# Patient Record
Sex: FEMALE | Race: BLACK OR AFRICAN AMERICAN | NOT HISPANIC OR LATINO
[De-identification: names, ages, dates, MRNs, and addresses within clinical notes are randomized per-mention and may not be internally consistent; named-entity substitution may affect disease eponyms.]

---

## 2018-09-10 PROBLEM — Z00.00 ENCOUNTER FOR PREVENTIVE HEALTH EXAMINATION: Status: ACTIVE | Noted: 2018-09-10

## 2018-09-25 ENCOUNTER — APPOINTMENT (OUTPATIENT)
Dept: THORACIC SURGERY | Facility: CLINIC | Age: 53
End: 2018-09-25
Payer: MEDICAID

## 2018-09-25 VITALS
WEIGHT: 215 LBS | RESPIRATION RATE: 16 BRPM | HEIGHT: 67 IN | BODY MASS INDEX: 33.74 KG/M2 | OXYGEN SATURATION: 100 % | HEART RATE: 69 BPM | TEMPERATURE: 98.7 F | SYSTOLIC BLOOD PRESSURE: 123 MMHG | DIASTOLIC BLOOD PRESSURE: 84 MMHG

## 2018-09-25 DIAGNOSIS — Z87.19 PERSONAL HISTORY OF OTHER DISEASES OF THE DIGESTIVE SYSTEM: ICD-10-CM

## 2018-09-25 DIAGNOSIS — Z80.3 FAMILY HISTORY OF MALIGNANT NEOPLASM OF BREAST: ICD-10-CM

## 2018-09-25 DIAGNOSIS — K27.9 PEPTIC ULCER, SITE UNSPECIFIED, UNSPECIFIED AS ACUTE OR CHRONIC, W/OUT HEMORRHAGE OR PERFORATION: ICD-10-CM

## 2018-09-25 DIAGNOSIS — B96.81 PEPTIC ULCER, SITE UNSPECIFIED, UNSPECIFIED AS ACUTE OR CHRONIC, W/OUT HEMORRHAGE OR PERFORATION: ICD-10-CM

## 2018-09-25 DIAGNOSIS — Z86.2 PERSONAL HISTORY OF DISEASES OF THE BLOOD AND BLOOD-FORMING ORGANS AND CERTAIN DISORDERS INVOLVING THE IMMUNE MECHANISM: ICD-10-CM

## 2018-09-25 PROCEDURE — 99205 OFFICE O/P NEW HI 60 MIN: CPT

## 2018-09-25 RX ORDER — GLUC/MSM/COLGN2/HYAL/ANTIARTH3 375-375-20
TABLET ORAL DAILY
Refills: 0 | Status: ACTIVE | COMMUNITY

## 2018-09-28 PROBLEM — Z87.19 HISTORY OF HIATAL HERNIA: Status: RESOLVED | Noted: 2018-09-28 | Resolved: 2018-09-28

## 2018-09-28 PROBLEM — Z87.19 HISTORY OF DYSPHAGIA: Status: RESOLVED | Noted: 2018-09-28 | Resolved: 2018-09-28

## 2018-09-28 PROBLEM — K27.9 H PYLORI ULCER: Status: RESOLVED | Noted: 2018-09-28 | Resolved: 2018-09-28

## 2018-09-28 PROBLEM — Z86.2 HISTORY OF ANEMIA: Status: RESOLVED | Noted: 2018-09-28 | Resolved: 2018-09-28

## 2018-10-09 ENCOUNTER — FORM ENCOUNTER (OUTPATIENT)
Age: 53
End: 2018-10-09

## 2018-10-10 ENCOUNTER — APPOINTMENT (OUTPATIENT)
Dept: CT IMAGING | Facility: IMAGING CENTER | Age: 53
End: 2018-10-10
Payer: MEDICAID

## 2018-10-10 ENCOUNTER — OUTPATIENT (OUTPATIENT)
Dept: OUTPATIENT SERVICES | Facility: HOSPITAL | Age: 53
LOS: 1 days | End: 2018-10-10

## 2018-10-10 ENCOUNTER — OUTPATIENT (OUTPATIENT)
Dept: OUTPATIENT SERVICES | Facility: HOSPITAL | Age: 53
LOS: 1 days | End: 2018-10-10
Payer: MEDICAID

## 2018-10-10 VITALS
RESPIRATION RATE: 18 BRPM | HEIGHT: 67 IN | TEMPERATURE: 98 F | SYSTOLIC BLOOD PRESSURE: 120 MMHG | DIASTOLIC BLOOD PRESSURE: 80 MMHG | HEART RATE: 60 BPM | WEIGHT: 210.1 LBS

## 2018-10-10 DIAGNOSIS — L90.9 ATROPHIC DISORDER OF SKIN, UNSPECIFIED: Chronic | ICD-10-CM

## 2018-10-10 DIAGNOSIS — K44.9 DIAPHRAGMATIC HERNIA WITHOUT OBSTRUCTION OR GANGRENE: ICD-10-CM

## 2018-10-10 DIAGNOSIS — Z64.1 PROBLEMS RELATED TO MULTIPARITY: Chronic | ICD-10-CM

## 2018-10-10 LAB
BLD GP AB SCN SERPL QL: NEGATIVE — SIGNIFICANT CHANGE UP
BUN SERPL-MCNC: 14 MG/DL — SIGNIFICANT CHANGE UP (ref 7–23)
CALCIUM SERPL-MCNC: 8.7 MG/DL — SIGNIFICANT CHANGE UP (ref 8.4–10.5)
CHLORIDE SERPL-SCNC: 103 MMOL/L — SIGNIFICANT CHANGE UP (ref 98–107)
CO2 SERPL-SCNC: 25 MMOL/L — SIGNIFICANT CHANGE UP (ref 22–31)
CREAT SERPL-MCNC: 0.92 MG/DL — SIGNIFICANT CHANGE UP (ref 0.5–1.3)
GLUCOSE SERPL-MCNC: 142 MG/DL — HIGH (ref 70–99)
HCT VFR BLD CALC: 42.8 % — SIGNIFICANT CHANGE UP (ref 34.5–45)
HGB BLD-MCNC: 13.3 G/DL — SIGNIFICANT CHANGE UP (ref 11.5–15.5)
MCHC RBC-ENTMCNC: 26.2 PG — LOW (ref 27–34)
MCHC RBC-ENTMCNC: 31.1 % — LOW (ref 32–36)
MCV RBC AUTO: 84.3 FL — SIGNIFICANT CHANGE UP (ref 80–100)
NRBC # FLD: 0 — SIGNIFICANT CHANGE UP
PLATELET # BLD AUTO: 195 K/UL — SIGNIFICANT CHANGE UP (ref 150–400)
PMV BLD: 12.5 FL — SIGNIFICANT CHANGE UP (ref 7–13)
POTASSIUM SERPL-MCNC: 4.1 MMOL/L — SIGNIFICANT CHANGE UP (ref 3.5–5.3)
POTASSIUM SERPL-SCNC: 4.1 MMOL/L — SIGNIFICANT CHANGE UP (ref 3.5–5.3)
RBC # BLD: 5.08 M/UL — SIGNIFICANT CHANGE UP (ref 3.8–5.2)
RBC # FLD: 13.2 % — SIGNIFICANT CHANGE UP (ref 10.3–14.5)
RH IG SCN BLD-IMP: POSITIVE — SIGNIFICANT CHANGE UP
SODIUM SERPL-SCNC: 139 MMOL/L — SIGNIFICANT CHANGE UP (ref 135–145)
WBC # BLD: 4.89 K/UL — SIGNIFICANT CHANGE UP (ref 3.8–10.5)
WBC # FLD AUTO: 4.89 K/UL — SIGNIFICANT CHANGE UP (ref 3.8–10.5)

## 2018-10-10 PROCEDURE — 71250 CT THORAX DX C-: CPT

## 2018-10-10 PROCEDURE — 71250 CT THORAX DX C-: CPT | Mod: 26

## 2018-10-10 RX ORDER — SODIUM CHLORIDE 9 MG/ML
1000 INJECTION, SOLUTION INTRAVENOUS
Qty: 0 | Refills: 0 | Status: DISCONTINUED | OUTPATIENT
Start: 2018-10-24 | End: 2018-10-25

## 2018-10-10 NOTE — H&P PST ADULT - NSANTHOSAYNRD_GEN_A_CORE
No. GURWINDER screening performed.  STOP BANG Legend: 0-2 = LOW Risk; 3-4 = INTERMEDIATE Risk; 5-8 = HIGH Risk

## 2018-10-10 NOTE — H&P PST ADULT - NS PRO TALK SOMEONE YN
Patient to cath lab for pacemaker placement at this time in stable condition, accompanied by ALEXANDRA Underwood.   no

## 2018-10-10 NOTE — H&P PST ADULT - PROBLEM SELECTOR PLAN 1
This is a 52 y/o female who is scheduled for upper endoscopy, lap hiatal hernia repair, robotic assisted Nissen fundoplication on 10-24-18  * Given scrub cleanser and preop famotidine

## 2018-10-24 ENCOUNTER — APPOINTMENT (OUTPATIENT)
Dept: THORACIC SURGERY | Facility: HOSPITAL | Age: 53
End: 2018-10-24

## 2018-10-24 ENCOUNTER — INPATIENT (INPATIENT)
Facility: HOSPITAL | Age: 53
LOS: 1 days | Discharge: HOME | End: 2018-10-26
Attending: THORACIC SURGERY (CARDIOTHORACIC VASCULAR SURGERY) | Admitting: THORACIC SURGERY (CARDIOTHORACIC VASCULAR SURGERY)
Payer: MEDICAID

## 2018-10-24 ENCOUNTER — RESULT REVIEW (OUTPATIENT)
Age: 53
End: 2018-10-24

## 2018-10-24 VITALS
DIASTOLIC BLOOD PRESSURE: 79 MMHG | WEIGHT: 210.1 LBS | RESPIRATION RATE: 15 BRPM | OXYGEN SATURATION: 98 % | SYSTOLIC BLOOD PRESSURE: 117 MMHG | HEART RATE: 72 BPM | TEMPERATURE: 98 F | HEIGHT: 67 IN

## 2018-10-24 DIAGNOSIS — Z64.1 PROBLEMS RELATED TO MULTIPARITY: Chronic | ICD-10-CM

## 2018-10-24 DIAGNOSIS — L90.9 ATROPHIC DISORDER OF SKIN, UNSPECIFIED: Chronic | ICD-10-CM

## 2018-10-24 DIAGNOSIS — K44.9 DIAPHRAGMATIC HERNIA WITHOUT OBSTRUCTION OR GANGRENE: ICD-10-CM

## 2018-10-24 LAB — RH IG SCN BLD-IMP: POSITIVE — SIGNIFICANT CHANGE UP

## 2018-10-24 PROCEDURE — 71045 X-RAY EXAM CHEST 1 VIEW: CPT | Mod: 26,77

## 2018-10-24 PROCEDURE — 71045 X-RAY EXAM CHEST 1 VIEW: CPT | Mod: 26,76

## 2018-10-24 PROCEDURE — 99233 SBSQ HOSP IP/OBS HIGH 50: CPT

## 2018-10-24 PROCEDURE — S2900 ROBOTIC SURGICAL SYSTEM: CPT | Mod: NC

## 2018-10-24 PROCEDURE — 32551 INSERTION OF CHEST TUBE: CPT

## 2018-10-24 PROCEDURE — 43281 LAP PARAESOPHAG HERN REPAIR: CPT

## 2018-10-24 PROCEDURE — 88302 TISSUE EXAM BY PATHOLOGIST: CPT | Mod: 26

## 2018-10-24 PROCEDURE — 43235 EGD DIAGNOSTIC BRUSH WASH: CPT

## 2018-10-24 RX ORDER — HEPARIN SODIUM 5000 [USP'U]/ML
5000 INJECTION INTRAVENOUS; SUBCUTANEOUS ONCE
Qty: 0 | Refills: 0 | Status: COMPLETED | OUTPATIENT
Start: 2018-10-24 | End: 2018-10-24

## 2018-10-24 RX ORDER — MIDAZOLAM HYDROCHLORIDE 1 MG/ML
1 INJECTION, SOLUTION INTRAMUSCULAR; INTRAVENOUS ONCE
Qty: 0 | Refills: 0 | Status: DISCONTINUED | OUTPATIENT
Start: 2018-10-24 | End: 2018-10-24

## 2018-10-24 RX ORDER — DIPHENHYDRAMINE HCL 50 MG
25 CAPSULE ORAL EVERY 4 HOURS
Qty: 0 | Refills: 0 | Status: DISCONTINUED | OUTPATIENT
Start: 2018-10-24 | End: 2018-10-25

## 2018-10-24 RX ORDER — ONDANSETRON 8 MG/1
4 TABLET, FILM COATED ORAL EVERY 6 HOURS
Qty: 0 | Refills: 0 | Status: DISCONTINUED | OUTPATIENT
Start: 2018-10-24 | End: 2018-10-25

## 2018-10-24 RX ORDER — SODIUM CHLORIDE 9 MG/ML
1000 INJECTION, SOLUTION INTRAVENOUS
Qty: 0 | Refills: 0 | Status: DISCONTINUED | OUTPATIENT
Start: 2018-10-24 | End: 2018-10-25

## 2018-10-24 RX ORDER — HYDROMORPHONE HYDROCHLORIDE 2 MG/ML
0.5 INJECTION INTRAMUSCULAR; INTRAVENOUS; SUBCUTANEOUS
Qty: 0 | Refills: 0 | Status: DISCONTINUED | OUTPATIENT
Start: 2018-10-24 | End: 2018-10-25

## 2018-10-24 RX ORDER — HYDROMORPHONE HYDROCHLORIDE 2 MG/ML
30 INJECTION INTRAMUSCULAR; INTRAVENOUS; SUBCUTANEOUS
Qty: 0 | Refills: 0 | Status: DISCONTINUED | OUTPATIENT
Start: 2018-10-24 | End: 2018-10-25

## 2018-10-24 RX ORDER — ACETAMINOPHEN 500 MG
1000 TABLET ORAL ONCE
Qty: 0 | Refills: 0 | Status: COMPLETED | OUTPATIENT
Start: 2018-10-24 | End: 2018-10-24

## 2018-10-24 RX ORDER — HEPARIN SODIUM 5000 [USP'U]/ML
5000 INJECTION INTRAVENOUS; SUBCUTANEOUS EVERY 8 HOURS
Qty: 0 | Refills: 0 | Status: DISCONTINUED | OUTPATIENT
Start: 2018-10-24 | End: 2018-10-26

## 2018-10-24 RX ORDER — NALOXONE HYDROCHLORIDE 4 MG/.1ML
0.1 SPRAY NASAL
Qty: 0 | Refills: 0 | Status: DISCONTINUED | OUTPATIENT
Start: 2018-10-24 | End: 2018-10-25

## 2018-10-24 RX ADMIN — HYDROMORPHONE HYDROCHLORIDE 30 MILLILITER(S): 2 INJECTION INTRAMUSCULAR; INTRAVENOUS; SUBCUTANEOUS at 19:16

## 2018-10-24 RX ADMIN — HEPARIN SODIUM 5000 UNIT(S): 5000 INJECTION INTRAVENOUS; SUBCUTANEOUS at 23:09

## 2018-10-24 RX ADMIN — HEPARIN SODIUM 5000 UNIT(S): 5000 INJECTION INTRAVENOUS; SUBCUTANEOUS at 06:20

## 2018-10-24 RX ADMIN — ONDANSETRON 4 MILLIGRAM(S): 8 TABLET, FILM COATED ORAL at 20:21

## 2018-10-24 RX ADMIN — Medication 1000 MILLIGRAM(S): at 20:45

## 2018-10-24 RX ADMIN — HYDROMORPHONE HYDROCHLORIDE 30 MILLILITER(S): 2 INJECTION INTRAMUSCULAR; INTRAVENOUS; SUBCUTANEOUS at 11:25

## 2018-10-24 RX ADMIN — SODIUM CHLORIDE 30 MILLILITER(S): 9 INJECTION, SOLUTION INTRAVENOUS at 06:20

## 2018-10-24 RX ADMIN — MIDAZOLAM HYDROCHLORIDE 1 MILLIGRAM(S): 1 INJECTION, SOLUTION INTRAMUSCULAR; INTRAVENOUS at 14:15

## 2018-10-24 RX ADMIN — SODIUM CHLORIDE 125 MILLILITER(S): 9 INJECTION, SOLUTION INTRAVENOUS at 19:16

## 2018-10-24 RX ADMIN — Medication 400 MILLIGRAM(S): at 20:14

## 2018-10-24 RX ADMIN — HEPARIN SODIUM 5000 UNIT(S): 5000 INJECTION INTRAVENOUS; SUBCUTANEOUS at 14:43

## 2018-10-24 RX ADMIN — SODIUM CHLORIDE 125 MILLILITER(S): 9 INJECTION, SOLUTION INTRAVENOUS at 11:30

## 2018-10-24 NOTE — PROGRESS NOTE ADULT - SUBJECTIVE AND OBJECTIVE BOX
KISHAN PALMER                     [id number]    HPI: This is a 52 y/o female who presents with progressively worsening symptomatology of hiatal hernia. Endoscopy confirmed pathology. Scheduled for upper endoscopy, lap hiatal hernia repair robotic assisted Nissen fundoplication on 10-24-18      Procedure: EGD, Hiatal Hernia repair      Issues:  Pneumothorax   Hiatal hernia  post op pain    PAST MEDICAL & SURGICAL HISTORY:  Hiatal hernia  Anemia  Multiparity: tubal ligation in 2004  Atrophy of skin: bilateral breast implants in 2007            VITAL SIGNS:  Vital Signs Last 24 Hrs  T(C): 36.9 (24 Oct 2018 11:05), Max: 36.9 (24 Oct 2018 11:05)  T(F): 98.4 (24 Oct 2018 11:05), Max: 98.4 (24 Oct 2018 11:05)  HR: 86 (24 Oct 2018 12:05) (72 - 103)  BP: 130/76 (24 Oct 2018 12:05) (117/79 - 140/77)  BP(mean): 88 (24 Oct 2018 12:05) (82 - 93)  RR: 21 (24 Oct 2018 12:05) (15 - 25)  SpO2: 94% (24 Oct 2018 12:05) (94% - 100%)    I/Os:   I&O's Detail    24 Oct 2018 07:01  -  24 Oct 2018 12:56  --------------------------------------------------------  IN:    lactated ringers.: 250 mL  Total IN: 250 mL    OUT:  Total OUT: 0 mL    Total NET: 250 mL          CAPILLARY BLOOD GLUCOSE          =======================MEDICATIONS===================  MEDICATIONS  (STANDING):  heparin  Injectable 5000 Unit(s) SubCutaneous every 8 hours  HYDROmorphone PCA (1 mG/mL) 30 milliLiter(s) PCA Continuous PCA Continuous  lactated ringers. 1000 milliLiter(s) (30 mL/Hr) IV Continuous <Continuous>  lactated ringers. 1000 milliLiter(s) (125 mL/Hr) IV Continuous <Continuous>    MEDICATIONS  (PRN):  diphenhydrAMINE   Injectable 25 milliGRAM(s) IV Push every 4 hours PRN Pruritus  HYDROmorphone PCA (1 mG/mL) Rescue Clinician Bolus 0.5 milliGRAM(s) IV Push every 15 minutes PRN for Pain Scale GREATER THAN 6  naloxone Injectable 0.1 milliGRAM(s) IV Push every 3 minutes PRN For ANY of the following changes in patient status:  A. RR LESS THAN 10 breaths per minute, B. Oxygen saturation LESS THAN 90%, C. Sedation score of 6  ondansetron Injectable 4 milliGRAM(s) IV Push every 6 hours PRN Nausea        PHYSICAL EXAM============================  General:                         Awake, alert, not in any distress  Neuro:                            Moving all extremities to commands.   Respiratory:	Air entry fair and  bilateral conducted sounds                                         CV:		Regular rate and rhythm. Normal S1/S2                                          Distal pulses present.  Abdomen:	                     Soft, non-distended. Bowel sounds present   Skin:		No rash.  Extremities:	Warm, no cyanosis or edema.  Palpable pulses        =============================NEUROLOGY============================  Pain control with PCA  / Tylenol IV / Toradol    ==============================RESPIRATORY========================  Pt is on  2  L nasal canula   Comfortable, not in any distress.  Using incentive spirometry 	  Continue bronchodilators, pulmonary toilet    ============================CARDIOVASCULAR======================  Continue hemodynamic monitoring.  Not on any pressors    =====================RENAL===================  Continue LR IVF   Monitor I/Os and electrolytes    ====================GASTROINTESTINAL===================  NPO, Barium in am  Continue GI prophylaxis with  Protonix  Continue Zofran / Reglan for nausea - PRN	    ========================HEMATOLOGIC/ONCOLOGIC====================  Monitor chest tube output. No signs of active bleeding.   Follow CBC in AM    ============================INFECTIOUS DISEASE========================  Monitor for fever / leukocytosis.  All surgical incision / chest tube  sites look clean      Pt is on GI & DVT prophylaxis  OOB & ambulate       Pertinent clinical, laboratory, radiographic, hemodynamic, echocardiographic, respiratory data, microbiologic data and chart were reviewed and analyzed frequently throughout the course of the day and night  Patient seen, examined and plan discussed with CT Surgery / CTICU team during rounds.    Pt's status discussed with family at bedside, updated status        Fercho Iraheta DO FACEP

## 2018-10-24 NOTE — PROCEDURE NOTE - NSPROCDETAILS_GEN_ALL_CORE
percutaneous/dressing applied/secured in place/thoracostomy tube placed percutaneously/Seldinger technique/sterile dressing applied

## 2018-10-24 NOTE — PROCEDURE NOTE - PROCEDURE
<<-----Click on this checkbox to enter Procedure Chest tube placement with US guidance  10/24/2018  right pigtail catheter placed  Active  [email or web address]

## 2018-10-24 NOTE — BRIEF OPERATIVE NOTE - PROCEDURE
<<-----Click on this checkbox to enter Procedure Robotic assistance in laparoscopic procedure  10/24/2018  Hiatal Hernia Repair, Nissen Fundoplication  Active  JUNG NEHA  10/24/2018    Active  JUNG

## 2018-10-25 LAB
APTT BLD: 31.1 SEC — SIGNIFICANT CHANGE UP (ref 27.5–37.4)
BASOPHILS # BLD AUTO: 0.01 K/UL — SIGNIFICANT CHANGE UP (ref 0–0.2)
BASOPHILS NFR BLD AUTO: 0.1 % — SIGNIFICANT CHANGE UP (ref 0–2)
BUN SERPL-MCNC: 9 MG/DL — SIGNIFICANT CHANGE UP (ref 7–23)
CALCIUM SERPL-MCNC: 9.1 MG/DL — SIGNIFICANT CHANGE UP (ref 8.4–10.5)
CHLORIDE SERPL-SCNC: 98 MMOL/L — SIGNIFICANT CHANGE UP (ref 98–107)
CO2 SERPL-SCNC: 23 MMOL/L — SIGNIFICANT CHANGE UP (ref 22–31)
CREAT SERPL-MCNC: 0.67 MG/DL — SIGNIFICANT CHANGE UP (ref 0.5–1.3)
EOSINOPHIL # BLD AUTO: 0 K/UL — SIGNIFICANT CHANGE UP (ref 0–0.5)
EOSINOPHIL NFR BLD AUTO: 0 % — SIGNIFICANT CHANGE UP (ref 0–6)
GLUCOSE SERPL-MCNC: 154 MG/DL — HIGH (ref 70–99)
HCT VFR BLD CALC: 42.3 % — SIGNIFICANT CHANGE UP (ref 34.5–45)
HGB BLD-MCNC: 13.2 G/DL — SIGNIFICANT CHANGE UP (ref 11.5–15.5)
IMM GRANULOCYTES # BLD AUTO: 0.05 # — SIGNIFICANT CHANGE UP
IMM GRANULOCYTES NFR BLD AUTO: 0.4 % — SIGNIFICANT CHANGE UP (ref 0–1.5)
INR BLD: 1 — SIGNIFICANT CHANGE UP (ref 0.88–1.17)
LYMPHOCYTES # BLD AUTO: 1.96 K/UL — SIGNIFICANT CHANGE UP (ref 1–3.3)
LYMPHOCYTES # BLD AUTO: 16.4 % — SIGNIFICANT CHANGE UP (ref 13–44)
MCHC RBC-ENTMCNC: 26.2 PG — LOW (ref 27–34)
MCHC RBC-ENTMCNC: 31.2 % — LOW (ref 32–36)
MCV RBC AUTO: 83.9 FL — SIGNIFICANT CHANGE UP (ref 80–100)
MONOCYTES # BLD AUTO: 0.99 K/UL — HIGH (ref 0–0.9)
MONOCYTES NFR BLD AUTO: 8.3 % — SIGNIFICANT CHANGE UP (ref 2–14)
NEUTROPHILS # BLD AUTO: 8.92 K/UL — HIGH (ref 1.8–7.4)
NEUTROPHILS NFR BLD AUTO: 74.8 % — SIGNIFICANT CHANGE UP (ref 43–77)
NRBC # FLD: 0 — SIGNIFICANT CHANGE UP
PLATELET # BLD AUTO: 193 K/UL — SIGNIFICANT CHANGE UP (ref 150–400)
PMV BLD: 12.7 FL — SIGNIFICANT CHANGE UP (ref 7–13)
POTASSIUM SERPL-MCNC: 4.4 MMOL/L — SIGNIFICANT CHANGE UP (ref 3.5–5.3)
POTASSIUM SERPL-SCNC: 4.4 MMOL/L — SIGNIFICANT CHANGE UP (ref 3.5–5.3)
PROTHROM AB SERPL-ACNC: 11.1 SEC — SIGNIFICANT CHANGE UP (ref 9.8–13.1)
RBC # BLD: 5.04 M/UL — SIGNIFICANT CHANGE UP (ref 3.8–5.2)
RBC # FLD: 13.6 % — SIGNIFICANT CHANGE UP (ref 10.3–14.5)
SODIUM SERPL-SCNC: 136 MMOL/L — SIGNIFICANT CHANGE UP (ref 135–145)
WBC # BLD: 11.93 K/UL — HIGH (ref 3.8–10.5)
WBC # FLD AUTO: 11.93 K/UL — HIGH (ref 3.8–10.5)

## 2018-10-25 PROCEDURE — 74220 X-RAY XM ESOPHAGUS 1CNTRST: CPT | Mod: 26

## 2018-10-25 PROCEDURE — 71045 X-RAY EXAM CHEST 1 VIEW: CPT | Mod: 26,76

## 2018-10-25 PROCEDURE — 99233 SBSQ HOSP IP/OBS HIGH 50: CPT

## 2018-10-25 PROCEDURE — 71045 X-RAY EXAM CHEST 1 VIEW: CPT | Mod: 26,77

## 2018-10-25 RX ORDER — POLYETHYLENE GLYCOL 3350 17 G/17G
17 POWDER, FOR SOLUTION ORAL DAILY
Qty: 0 | Refills: 0 | Status: DISCONTINUED | OUTPATIENT
Start: 2018-10-25 | End: 2018-10-26

## 2018-10-25 RX ORDER — DOCUSATE SODIUM 100 MG
100 CAPSULE ORAL
Qty: 0 | Refills: 0 | Status: DISCONTINUED | OUTPATIENT
Start: 2018-10-25 | End: 2018-10-26

## 2018-10-25 RX ORDER — BENZOCAINE AND MENTHOL 5; 1 G/100ML; G/100ML
1 LIQUID ORAL EVERY 4 HOURS
Qty: 0 | Refills: 0 | Status: DISCONTINUED | OUTPATIENT
Start: 2018-10-25 | End: 2018-10-26

## 2018-10-25 RX ORDER — ACETAMINOPHEN 500 MG
1000 TABLET ORAL ONCE
Qty: 0 | Refills: 0 | Status: COMPLETED | OUTPATIENT
Start: 2018-10-25 | End: 2018-10-25

## 2018-10-25 RX ORDER — SIMETHICONE 80 MG/1
80 TABLET, CHEWABLE ORAL EVERY 8 HOURS
Qty: 0 | Refills: 0 | Status: DISCONTINUED | OUTPATIENT
Start: 2018-10-25 | End: 2018-10-26

## 2018-10-25 RX ORDER — OXYCODONE HYDROCHLORIDE 5 MG/1
5 TABLET ORAL EVERY 4 HOURS
Qty: 0 | Refills: 0 | Status: DISCONTINUED | OUTPATIENT
Start: 2018-10-25 | End: 2018-10-26

## 2018-10-25 RX ORDER — SIMETHICONE 80 MG/1
80 TABLET, CHEWABLE ORAL ONCE
Qty: 0 | Refills: 0 | Status: COMPLETED | OUTPATIENT
Start: 2018-10-25 | End: 2018-10-25

## 2018-10-25 RX ADMIN — OXYCODONE HYDROCHLORIDE 5 MILLIGRAM(S): 5 TABLET ORAL at 21:54

## 2018-10-25 RX ADMIN — HEPARIN SODIUM 5000 UNIT(S): 5000 INJECTION INTRAVENOUS; SUBCUTANEOUS at 13:56

## 2018-10-25 RX ADMIN — HYDROMORPHONE HYDROCHLORIDE 30 MILLILITER(S): 2 INJECTION INTRAMUSCULAR; INTRAVENOUS; SUBCUTANEOUS at 10:36

## 2018-10-25 RX ADMIN — Medication 400 MILLIGRAM(S): at 08:00

## 2018-10-25 RX ADMIN — HEPARIN SODIUM 5000 UNIT(S): 5000 INJECTION INTRAVENOUS; SUBCUTANEOUS at 06:53

## 2018-10-25 RX ADMIN — OXYCODONE HYDROCHLORIDE 5 MILLIGRAM(S): 5 TABLET ORAL at 21:24

## 2018-10-25 RX ADMIN — HYDROMORPHONE HYDROCHLORIDE 30 MILLILITER(S): 2 INJECTION INTRAMUSCULAR; INTRAVENOUS; SUBCUTANEOUS at 07:21

## 2018-10-25 RX ADMIN — ONDANSETRON 4 MILLIGRAM(S): 8 TABLET, FILM COATED ORAL at 12:21

## 2018-10-25 RX ADMIN — OXYCODONE HYDROCHLORIDE 5 MILLIGRAM(S): 5 TABLET ORAL at 17:16

## 2018-10-25 RX ADMIN — SIMETHICONE 80 MILLIGRAM(S): 80 TABLET, CHEWABLE ORAL at 14:29

## 2018-10-25 RX ADMIN — SODIUM CHLORIDE 125 MILLILITER(S): 9 INJECTION, SOLUTION INTRAVENOUS at 07:21

## 2018-10-25 RX ADMIN — BENZOCAINE AND MENTHOL 1 LOZENGE: 5; 1 LIQUID ORAL at 17:17

## 2018-10-25 RX ADMIN — SIMETHICONE 80 MILLIGRAM(S): 80 TABLET, CHEWABLE ORAL at 20:53

## 2018-10-25 RX ADMIN — HEPARIN SODIUM 5000 UNIT(S): 5000 INJECTION INTRAVENOUS; SUBCUTANEOUS at 20:53

## 2018-10-25 RX ADMIN — ONDANSETRON 4 MILLIGRAM(S): 8 TABLET, FILM COATED ORAL at 02:30

## 2018-10-25 RX ADMIN — Medication 1000 MILLIGRAM(S): at 08:15

## 2018-10-25 NOTE — PROGRESS NOTE ADULT - SUBJECTIVE AND OBJECTIVE BOX
Anesthesia Pain Management Service    SUBJECTIVE: Patient is doing well with IV PCA and no significant problems reported.    Pain Scale Score	At rest: _2__ 	With Activity: ___ 	[X ] Refer to charted pain scores    THERAPY:    [ ] IV PCA Morphine		[ ] 5 mg/mL	[ ] 1 mg/mL  [X ] IV PCA Hydromorphone	[ ] 5 mg/mL	[X ] 1 mg/mL  [ ] IV PCA Fentanyl		[ ] 50 micrograms/mL    Demand dose __0.2_ lockout __6_ (minutes) Continuous Rate _0__ Total: _3.8__  mg used (in past 24 hours)      MEDICATIONS  (STANDING):  heparin  Injectable 5000 Unit(s) SubCutaneous every 8 hours  HYDROmorphone PCA (1 mG/mL) 30 milliLiter(s) PCA Continuous PCA Continuous  lactated ringers. 1000 milliLiter(s) (125 mL/Hr) IV Continuous <Continuous>    MEDICATIONS  (PRN):  diphenhydrAMINE   Injectable 25 milliGRAM(s) IV Push every 4 hours PRN Pruritus  HYDROmorphone PCA (1 mG/mL) Rescue Clinician Bolus 0.5 milliGRAM(s) IV Push every 15 minutes PRN for Pain Scale GREATER THAN 6  naloxone Injectable 0.1 milliGRAM(s) IV Push every 3 minutes PRN For ANY of the following changes in patient status:  A. RR LESS THAN 10 breaths per minute, B. Oxygen saturation LESS THAN 90%, C. Sedation score of 6  ondansetron Injectable 4 milliGRAM(s) IV Push every 6 hours PRN Nausea      OBJECTIVE: sitting in chair     Sedation Score:	[ X] Alert	[ ] Drowsy 	[ ] Arousable	[ ] Asleep	[ ] Unresponsive    Side Effects:	[X ] None	[ ] Nausea	[ ] Vomiting	[ ] Pruritus  		[ ] Other:    Vital Signs Last 24 Hrs  T(C): 36.9 (25 Oct 2018 08:00), Max: 36.9 (24 Oct 2018 11:05)  T(F): 98.5 (25 Oct 2018 08:00), Max: 98.5 (25 Oct 2018 08:00)  HR: 83 (25 Oct 2018 09:00) (62 - 103)  BP: 140/77 (25 Oct 2018 09:00) (103/62 - 151/101)  BP(mean): 93 (25 Oct 2018 09:00) (70 - 115)  RR: 24 (25 Oct 2018 09:00) (11 - 25)  SpO2: 100% (25 Oct 2018 09:00) (71% - 100%)    ASSESSMENT/ PLAN    Therapy to  be:	[ X] Continue   [ ] Discontinued   [ ] Change to prn Analgesics    Documentation and Verification of current medications:   [X] Done	[ ] Not done, not elligible    Comments: plan to D/C tomorrow

## 2018-10-25 NOTE — PROGRESS NOTE ADULT - SUBJECTIVE AND OBJECTIVE BOX
Anesthesia Pain Management Service    SUBJECTIVE: Pt doing well with IV PCA without problems reported.    Therapy:	  [ X] IV PCA	   [ ] Epidural           [ ] s/p Spinal Opoid              [ ] Postpartum infusion	  [ ] Patient controlled regional anesthesia (PCRA)    [ ] prn Analgesics    Allergies    No Known Allergies    Intolerances      MEDICATIONS  (STANDING):  heparin  Injectable 5000 Unit(s) SubCutaneous every 8 hours  HYDROmorphone PCA (1 mG/mL) 30 milliLiter(s) PCA Continuous PCA Continuous  lactated ringers. 1000 milliLiter(s) (125 mL/Hr) IV Continuous <Continuous>    MEDICATIONS  (PRN):  diphenhydrAMINE   Injectable 25 milliGRAM(s) IV Push every 4 hours PRN Pruritus  HYDROmorphone PCA (1 mG/mL) Rescue Clinician Bolus 0.5 milliGRAM(s) IV Push every 15 minutes PRN for Pain Scale GREATER THAN 6  naloxone Injectable 0.1 milliGRAM(s) IV Push every 3 minutes PRN For ANY of the following changes in patient status:  A. RR LESS THAN 10 breaths per minute, B. Oxygen saturation LESS THAN 90%, C. Sedation score of 6  ondansetron Injectable 4 milliGRAM(s) IV Push every 6 hours PRN Nausea      OBJECTIVE:   [X] No new signs     [ ] Other:    Side Effects:  [X ] None			[ ] Other:    Assessment of Catheter Site:		[ ] Intact		[ ] Other:    ASSESSMENT/PLAN  [ X] Continue current therapy    [ ] Therapy changed to:    [ ] IV PCA       [ ] Epidural     [ ] prn Analgesics     Comments:

## 2018-10-25 NOTE — PROGRESS NOTE ADULT - SUBJECTIVE AND OBJECTIVE BOX
KISHAN PALMER                     [id number]      HPI: This is a 54 y/o female who presents with progressively worsening symptomatology of hiatal hernia. Endoscopy confirmed pathology. Scheduled for upper endoscopy, lap hiatal hernia repair robotic assisted Nissen fundoplication on 10-24-18      Procedure: EGD, Hiatal Hernia repair      Issues:  Pneumothorax   Hiatal hernia  post op pain        PAST MEDICAL & SURGICAL HISTORY:  Hiatal hernia  Anemia  Multiparity: tubal ligation in 2004  Atrophy of skin: bilateral breast implants in 2007            VITAL SIGNS:  Vital Signs Last 24 Hrs  T(C): 36.4 (25 Oct 2018 04:00), Max: 36.9 (24 Oct 2018 11:05)  T(F): 97.6 (25 Oct 2018 04:00), Max: 98.4 (24 Oct 2018 11:05)  HR: 81 (25 Oct 2018 06:00) (62 - 103)  BP: 120/80 (25 Oct 2018 05:00) (103/62 - 140/77)  BP(mean): 89 (25 Oct 2018 05:00) (70 - 93)  RR: 24 (25 Oct 2018 06:00) (11 - 25)  SpO2: 100% (25 Oct 2018 06:00) (94% - 100%)    I/Os:   I&O's Detail    24 Oct 2018 07:01  -  25 Oct 2018 06:54  --------------------------------------------------------  IN:    IV PiggyBack: 100 mL    lactated ringers.: 2625 mL  Total IN: 2725 mL    OUT:    Chest Tube: 25 mL    Voided: 1400 mL  Total OUT: 1425 mL    Total NET: 1300 mL          CAPILLARY BLOOD GLUCOSE          =======================MEDICATIONS===================  MEDICATIONS  (STANDING):  heparin  Injectable 5000 Unit(s) SubCutaneous every 8 hours  HYDROmorphone PCA (1 mG/mL) 30 milliLiter(s) PCA Continuous PCA Continuous  lactated ringers. 1000 milliLiter(s) (30 mL/Hr) IV Continuous <Continuous>  lactated ringers. 1000 milliLiter(s) (125 mL/Hr) IV Continuous <Continuous>    MEDICATIONS  (PRN):  diphenhydrAMINE   Injectable 25 milliGRAM(s) IV Push every 4 hours PRN Pruritus  HYDROmorphone PCA (1 mG/mL) Rescue Clinician Bolus 0.5 milliGRAM(s) IV Push every 15 minutes PRN for Pain Scale GREATER THAN 6  naloxone Injectable 0.1 milliGRAM(s) IV Push every 3 minutes PRN For ANY of the following changes in patient status:  A. RR LESS THAN 10 breaths per minute, B. Oxygen saturation LESS THAN 90%, C. Sedation score of 6  ondansetron Injectable 4 milliGRAM(s) IV Push every 6 hours PRN Nausea      PHYSICAL EXAM============================  General:                         Awake, alert, not in any distress  Neuro:                            Moving all extremities to commands.   Respiratory:	Air entry fair and  bilateral conducted sounds                                           CV:		Regular rate and rhythm. Normal S1/S2                                          Distal pulses present.  Abdomen:	                     Soft, non-distended. Bowel sounds present   Skin:		No rash.  Extremities:	Warm, no cyanosis or edema.  Palpable pulses    ============================LABS=========================                        13.2   11.93 )-----------( 193      ( 25 Oct 2018 03:00 )             42.3     10-25    136  |  98  |  9   ----------------------------<  154<H>  4.4   |  23  |  0.67    Ca    9.1      25 Oct 2018 03:00        PT/INR - ( 25 Oct 2018 03:00 )   PT: 11.1 SEC;   INR: 1.00          PTT - ( 25 Oct 2018 03:00 )  PTT:31.1 SEC        ============================IMAGING STUDIES=========================  < from: Xray Chest 1 View- PORTABLE-Urgent (10.24.18 @ 13:40) >    Moderate to large right pneumothorax is present. Left perihilar opacity   may represent edema. The heart is not enlarged.   Pneumomediastinum is   seen.    1:14 PM:  Large right pneumothorax is present. Improved appearance of the left lung   from the last study. Heart size is stable. No effusions.    COMPARISON:  CT chest October 10, 2018      IMPRESSION:  Post hiatal hernia repair with right pneumothorax.    ============================NEUROLOGY============================  Pain control with PCA  / Tylenol IV / Toradol    ==============================RESPIRATORY========================  Pt is on  2  L nasal canula   Comfortable, not in any distress.  Using incentive spirometry 	  Continue bronchodilators, pulmonary toilet  Post op R large PTX w Pigtail placed, Pigtail right to suction, CXR: PTX improved.   ============================CARDIOVASCULAR======================  Continue hemodynamic monitoring.  Not on any pressors    =====================RENAL===================  Continue LR IVF   Monitor I/Os and electrolytes    ====================GASTROINTESTINAL===================  NPO, Barium today  Continue GI prophylaxis with  Protonix  Continue Zofran / Reglan for nausea - PRN	    ========================HEMATOLOGIC/ONCOLOGIC====================  Monitor chest tube output. No signs of active bleeding.   Follow CBC in AM    ============================INFECTIOUS DISEASE========================  Monitor for fever / leukocytosis.  All surgical incision / chest tube  sites look clean      Pt is on GI & DVT prophylaxis  OOB & ambulate       Pertinent clinical, laboratory, radiographic, hemodynamic, echocardiographic, respiratory data, microbiologic data and chart were reviewed and analyzed frequently throughout the course of the day and night  Patient seen, examined and plan discussed with CT Surgery / CTICU team during rounds.    Pt's status discussed with family at bedside, updated status        Fercho Iraheta DO FACEP

## 2018-10-25 NOTE — PROGRESS NOTE ADULT - SUBJECTIVE AND OBJECTIVE BOX
ANESTHESIA POSTOP CHECK    53y Female POSTOP DAY 1 S/P     [ X] NO APPARENT ANESTHESIA COMPLICATIONS      Comments:

## 2018-10-26 ENCOUNTER — TRANSCRIPTION ENCOUNTER (OUTPATIENT)
Age: 53
End: 2018-10-26

## 2018-10-26 VITALS
SYSTOLIC BLOOD PRESSURE: 120 MMHG | HEART RATE: 86 BPM | OXYGEN SATURATION: 100 % | TEMPERATURE: 98 F | RESPIRATION RATE: 24 BRPM | DIASTOLIC BLOOD PRESSURE: 56 MMHG

## 2018-10-26 PROCEDURE — 71045 X-RAY EXAM CHEST 1 VIEW: CPT | Mod: 26

## 2018-10-26 RX ORDER — ACETAMINOPHEN 500 MG
2 TABLET ORAL
Qty: 112 | Refills: 0 | OUTPATIENT
Start: 2018-10-26 | End: 2018-11-08

## 2018-10-26 RX ORDER — BENZOCAINE AND MENTHOL 5; 1 G/100ML; G/100ML
1 LIQUID ORAL
Qty: 0 | Refills: 0 | COMMUNITY
Start: 2018-10-26

## 2018-10-26 RX ORDER — OXYCODONE HYDROCHLORIDE 5 MG/1
1 TABLET ORAL
Qty: 30 | Refills: 0 | OUTPATIENT
Start: 2018-10-26 | End: 2018-10-30

## 2018-10-26 RX ORDER — DOCUSATE SODIUM 100 MG
1 CAPSULE ORAL
Qty: 28 | Refills: 0 | OUTPATIENT
Start: 2018-10-26 | End: 2018-11-08

## 2018-10-26 RX ORDER — SIMETHICONE 80 MG/1
1 TABLET, CHEWABLE ORAL
Qty: 42 | Refills: 0 | OUTPATIENT
Start: 2018-10-26 | End: 2018-11-08

## 2018-10-26 RX ORDER — POLYETHYLENE GLYCOL 3350 17 G/17G
17 POWDER, FOR SOLUTION ORAL
Qty: 0 | Refills: 0 | COMMUNITY
Start: 2018-10-26

## 2018-10-26 RX ADMIN — OXYCODONE HYDROCHLORIDE 5 MILLIGRAM(S): 5 TABLET ORAL at 05:13

## 2018-10-26 RX ADMIN — SIMETHICONE 80 MILLIGRAM(S): 80 TABLET, CHEWABLE ORAL at 05:13

## 2018-10-26 RX ADMIN — OXYCODONE HYDROCHLORIDE 5 MILLIGRAM(S): 5 TABLET ORAL at 05:43

## 2018-10-26 RX ADMIN — HEPARIN SODIUM 5000 UNIT(S): 5000 INJECTION INTRAVENOUS; SUBCUTANEOUS at 05:13

## 2018-10-26 RX ADMIN — Medication 100 MILLIGRAM(S): at 05:13

## 2018-10-26 NOTE — DISCHARGE NOTE ADULT - NS AS ACTIVITY OBS
Walking-Outdoors allowed/Walking-Indoors allowed/No Heavy lifting/straining/Showering allowed/Stairs allowed/Sex allowed/Do not drive or operate machinery Showering allowed/Walking-Indoors allowed/No Heavy lifting/straining/Walking-Outdoors allowed/Do not make important decisions/Do not drive or operate machinery/Stairs allowed/Sex allowed

## 2018-10-26 NOTE — DISCHARGE NOTE ADULT - CARE PLAN
Principal Discharge DX:	Hiatal hernia  Goal:	Wound healing; improvement of initial complaints.  Assessment and plan of treatment:	Stable for outpatient follow up Principal Discharge DX:	Hiatal hernia  Goal:	Wound healing; improvement of initial complaints.  Assessment and plan of treatment:	Follow above diet instructions. Take prescribed meds. Walk frequently. You may climb stairs.   Keep all wounds uncovered and shower daily.   See Dr. Shepherd in 2 weeks. Call for an apt. [phone]  Have a chest xray prior to that apt and bring those images with you.

## 2018-10-26 NOTE — DISCHARGE NOTE ADULT - PLAN OF CARE
Wound healing; improvement of initial complaints. Stable for outpatient follow up Follow above diet instructions. Take prescribed meds. Walk frequently. You may climb stairs.   Keep all wounds uncovered and shower daily.   See Dr. Shepherd in 2 weeks. Call for an apt. [phone]  Have a chest xray prior to that apt and bring those images with you.

## 2018-10-26 NOTE — DISCHARGE NOTE ADULT - PATIENT PORTAL LINK FT
You can access the [facility], offered by [facility], by registering with the following website: [email or web address]

## 2018-10-26 NOTE — DISCHARGE NOTE ADULT - INSTRUCTIONS
clear liquid diet Continue on clear liquid diet for today and tomorrow. Then on Sunday start a Full liquid diet for 3 days. Then start a soft diet as tolerated. Continue liquids as needed if a soft diet is difficult. No large meals at a time. Continue to stay upright for at least an hour after each meal. Avoid gassy or spicy foods. No carbonated beverages at any point. Call 911 for chest pain, and/or difficulty breathing. Report to your doctor any fever, foul odor drainage/redness/swelling to incisions.

## 2018-10-26 NOTE — DISCHARGE NOTE ADULT - MEDICATION SUMMARY - MEDICATIONS TO TAKE
I will START or STAY ON the medications listed below when I get home from the hospital:    Iron  -- 1 tab(s) by mouth once a day am  -- Indication: For supplement    Tylenol 325 mg oral tablet  -- 2 tab(s) by mouth every 6 hours, As Needed  -- Indication: For pain    oxyCODONE 5 mg oral tablet  -- 1 tab(s) by mouth every 4 hours, As needed, moderate to severe pain. MDD:6  -- Indication: For pain    docusate sodium 100 mg oral capsule  -- 1 cap(s) by mouth 2 times a day  -- Indication: For constipation    polyethylene glycol 3350 oral powder for reconstitution  -- 17 gram(s) by mouth once a day  -- Indication: For constipation    simethicone 80 mg oral tablet, chewable  -- 1 tab(s) by mouth every 8 hours  -- Indication: For gas    benzocaine-menthol 15 mg-3.6 mg mucous membrane lozenge  -- 1  mucous membrane every 4 hours, As Needed  -- Indication: For sore throat

## 2018-10-26 NOTE — DISCHARGE NOTE ADULT - CARE PROVIDER_API CALL
Kodak Shepherd), Surgery; Thoracic Surgery  [address] [address] [place] [address]  [place] [address]  Phone: [phone]  Fax: [phone]    Dr Maryana  Phone: [phone]  Fax: (   )    -

## 2018-10-26 NOTE — DISCHARGE NOTE ADULT - HOSPITAL COURSE
This is a 54 y/o female presented with progressively worsening symptomatology of hiatal hernia underwent an upper endoscopy, lap hiatal hernia repair robotic assisted Nissen fundoplication on 10-24-18.  She tolerated a clear diet after passing her barium swallow. This is a 52 y/o female presented with progressively worsening symptomatology of hiatal hernia underwent an upper endoscopy, lap hiatal hernia repair robotic assisted Nissen fundoplication on 10-24-18.  She tolerated a clear diet after passing her barium swallow. Pt with some c/o gas however passing flatus and belching. Wounds c/d/i. Pt ambulating frequently. Tolerating clear diet. Lungs CTA. PTC removed for ptx, CXR stable. Cleared for d/c to home by Dr. Martinez.

## 2018-11-05 PROBLEM — K44.9 DIAPHRAGMATIC HERNIA WITHOUT OBSTRUCTION OR GANGRENE: Chronic | Status: ACTIVE | Noted: 2018-10-10

## 2018-11-05 PROBLEM — D64.9 ANEMIA, UNSPECIFIED: Chronic | Status: ACTIVE | Noted: 2018-10-10

## 2018-11-12 ENCOUNTER — FORM ENCOUNTER (OUTPATIENT)
Age: 53
End: 2018-11-12

## 2018-11-13 ENCOUNTER — APPOINTMENT (OUTPATIENT)
Dept: THORACIC SURGERY | Facility: CLINIC | Age: 53
End: 2018-11-13
Payer: MEDICAID

## 2018-11-13 ENCOUNTER — APPOINTMENT (OUTPATIENT)
Dept: RADIOLOGY | Facility: HOSPITAL | Age: 53
End: 2018-11-13

## 2018-11-13 ENCOUNTER — OUTPATIENT (OUTPATIENT)
Dept: OUTPATIENT SERVICES | Facility: HOSPITAL | Age: 53
LOS: 1 days | End: 2018-11-13
Payer: MEDICAID

## 2018-11-13 VITALS
WEIGHT: 195 LBS | TEMPERATURE: 98.6 F | HEIGHT: 67 IN | DIASTOLIC BLOOD PRESSURE: 79 MMHG | OXYGEN SATURATION: 100 % | RESPIRATION RATE: 16 BRPM | BODY MASS INDEX: 30.61 KG/M2 | HEART RATE: 70 BPM | SYSTOLIC BLOOD PRESSURE: 112 MMHG

## 2018-11-13 DIAGNOSIS — Z64.1 PROBLEMS RELATED TO MULTIPARITY: Chronic | ICD-10-CM

## 2018-11-13 DIAGNOSIS — K44.9 DIAPHRAGMATIC HERNIA WITHOUT OBSTRUCTION OR GANGRENE: ICD-10-CM

## 2018-11-13 DIAGNOSIS — L90.9 ATROPHIC DISORDER OF SKIN, UNSPECIFIED: Chronic | ICD-10-CM

## 2018-11-13 PROCEDURE — 99024 POSTOP FOLLOW-UP VISIT: CPT

## 2018-11-13 PROCEDURE — 71046 X-RAY EXAM CHEST 2 VIEWS: CPT | Mod: 26

## 2019-01-11 NOTE — DISCHARGE NOTE ADULT - IF YOU ARE A SMOKER, IT IS IMPORTANT FOR YOUR HEALTH TO STOP SMOKING. PLEASE BE AWARE THAT SECOND HAND SMOKE IS ALSO HARMFUL.
Left message on her cell phone. Also spoke to  (she was at store).   Discussed with Dr Pietro Staton whether to consider mammaprint - ulitmately given her high risk situation the feelign was that we wanted to do everything possible to treat her cancer a
Statement Selected

## 2019-01-15 ENCOUNTER — OUTPATIENT (OUTPATIENT)
Dept: OUTPATIENT SERVICES | Facility: HOSPITAL | Age: 54
LOS: 1 days | End: 2019-01-15
Payer: MEDICAID

## 2019-01-15 ENCOUNTER — APPOINTMENT (OUTPATIENT)
Dept: RADIOLOGY | Facility: HOSPITAL | Age: 54
End: 2019-01-15

## 2019-01-15 ENCOUNTER — APPOINTMENT (OUTPATIENT)
Dept: THORACIC SURGERY | Facility: CLINIC | Age: 54
End: 2019-01-15
Payer: MEDICAID

## 2019-01-15 VITALS
BODY MASS INDEX: 30.45 KG/M2 | RESPIRATION RATE: 18 BRPM | WEIGHT: 194 LBS | OXYGEN SATURATION: 99 % | DIASTOLIC BLOOD PRESSURE: 81 MMHG | TEMPERATURE: 98.6 F | HEART RATE: 92 BPM | HEIGHT: 67 IN | SYSTOLIC BLOOD PRESSURE: 116 MMHG

## 2019-01-15 DIAGNOSIS — Z09 ENCOUNTER FOR FOLLOW-UP EXAMINATION AFTER COMPLETED TREATMENT FOR CONDITIONS OTHER THAN MALIGNANT NEOPLASM: ICD-10-CM

## 2019-01-15 DIAGNOSIS — L90.9 ATROPHIC DISORDER OF SKIN, UNSPECIFIED: Chronic | ICD-10-CM

## 2019-01-15 DIAGNOSIS — Z64.1 PROBLEMS RELATED TO MULTIPARITY: Chronic | ICD-10-CM

## 2019-01-15 DIAGNOSIS — K44.9 DIAPHRAGMATIC HERNIA WITHOUT OBSTRUCTION OR GANGRENE: ICD-10-CM

## 2019-01-15 PROCEDURE — 99024 POSTOP FOLLOW-UP VISIT: CPT

## 2019-01-15 PROCEDURE — 71046 X-RAY EXAM CHEST 2 VIEWS: CPT | Mod: 26

## 2019-01-16 PROBLEM — Z09 POSTOP CHECK: Status: ACTIVE | Noted: 2019-01-11

## 2019-07-05 PROBLEM — K44.9 HERNIA, HIATAL: Status: ACTIVE | Noted: 2018-09-25

## 2019-07-05 NOTE — HISTORY OF PRESENT ILLNESS
[FreeTextEntry1] : 54 y/o F w/ hx of anemia and GI bleed. Pt referred by Dr. Cecil Mcmillan for thoracic surgery consultation. Patient states that she was having episodes of lightheadedness and was discovered to have anemia. Patient was then sent for a EGD, colonoscopy, and CXR/barium swallow where a large sliding hiatal hernia was identified. Patient stated that she no longer has periods of lightheadedness since taking iron supplements. Patient however states that she has a feeling of food being "stuck in throat" but is resolved with large glasses of fluid.\par \par EGD on 6/30/18 was positive for H. Pylori, negative for intestinal metaplasia at the GE junction, stomach body, antrum, incisura of the stomach. \par \par CXR/barium swallow on 7/18/26 showed a large sliding hiatal hernia and large amounts of reflux to the esophagus. \par \par Now 9mon s/p EGD, laparoscopy, robotic assisted, repair of type III paraesophageal hernia, Nissen fundoplication on 10/24/18. Path was c/w gastric fat pad and hernia sac with one reactive LN.\par \par CXR today--\par \par Pt presents today for follow up.\par \par \par

## 2019-07-05 NOTE — ASSESSMENT
[FreeTextEntry1] : \par \par \par \par I have reviewed the patient's medical records and diagnostic images at time of this office consultation and have made the following recommendation:\par 1.\par \par \par Written by Soraida Kenny NP, acting as a scribe for Dr. Kodak Rouse. \par \par The documentation recorded by the scribe accurately reflects the service I personally performed and the decisions made by me. KODAK ROUSE MD\par

## 2019-07-05 NOTE — CONSULT LETTER
[Dear  ___] : Dear  [unfilled], [Consult Letter:] : I had the pleasure of evaluating your patient, [unfilled]. [( Thank you for referring [unfilled] for consultation for _____ )] : Thank you for referring [unfilled] for consultation for [unfilled] [Please see my note below.] : Please see my note below. [Consult Closing:] : Thank you very much for allowing me to participate in the care of this patient.  If you have any questions, please do not hesitate to contact me. [Sincerely,] : Sincerely, [FreeTextEntry2] : Dr. Cecil Mcmillan (Gastro/Referring)\par Dr. Mijares (PCP)  [FreeTextEntry3] : Kodak Shepherd MD, MPH \par System Director of Thoracic Surgery \par Director of Comprehensive Lung [facility] [place] \par Professor Cardiovascular & Thoracic Surgery  \par [employer] [facility]\par  [DrNicho  ___] : Dr. XIE

## 2019-07-09 ENCOUNTER — APPOINTMENT (OUTPATIENT)
Dept: THORACIC SURGERY | Facility: CLINIC | Age: 54
End: 2019-07-09

## 2019-07-09 DIAGNOSIS — K44.9 DIAPHRAGMATIC HERNIA W/OUT OBSTRUCTION OR GANGRENE: ICD-10-CM

## 2022-05-29 NOTE — ASU PATIENT PROFILE, ADULT - PAIN CHRONIC, PROFILE
Price (Use Numbers Only, No Special Characters Or $): 0 Render Post-Care Instructions In Note?: yes Consent: The patient's consent was obtained including but not limited to risks of crusting, scabbing, blistering, scarring, darker or lighter pigmentary change, recurrence, incomplete removal and infection. The patient understands that the procedure is cosmetic in nature and is not covered by insurance. Detail Level: Detailed Post-Care Instructions: I reviewed with the patient in detail post-care instructions. Patient is to wear sunprotection, and avoid picking at any of the treated lesions. Pt may apply Vaseline to crusted or scabbing areas. History/Exam/Medical Decision Making no

## 2022-12-19 NOTE — H&P PST ADULT - HISTORY OF PRESENT ILLNESS
Faxed completed fmla and return to work forms to [employer] @ [phone] attn:  Bhargavi Hitchcock This is a 52 y/o female who presents with progressively worsening symptomatology of hiatal hernia. Endoscopy confirmed pathology. Scheduled for upper endoscopy, lap hiatal hernia repair robotic assisted Nissen fundoplication on 10-24-18

## 2023-10-11 NOTE — PATIENT PROFILE ADULT - NSTRANSFERBELONGINGSRESP_GEN_A_NUR
Left mess that fasting lab is due a week prior to visit, orders at [facility], gave fasting instructions. Updated remind me.   yes